# Patient Record
Sex: MALE | Race: WHITE | Employment: OTHER | URBAN - METROPOLITAN AREA
[De-identification: names, ages, dates, MRNs, and addresses within clinical notes are randomized per-mention and may not be internally consistent; named-entity substitution may affect disease eponyms.]

---

## 2019-01-16 ENCOUNTER — HOSPITAL ENCOUNTER (EMERGENCY)
Age: 44
Discharge: HOME | End: 2019-01-16
Attending: EMERGENCY MEDICINE
Payer: OTHER GOVERNMENT

## 2019-01-16 ENCOUNTER — APPOINTMENT (OUTPATIENT)
Dept: CT IMAGING | Age: 44
End: 2019-01-16
Payer: OTHER GOVERNMENT

## 2019-01-16 VITALS
HEART RATE: 80 BPM | BODY MASS INDEX: 25.67 KG/M2 | OXYGEN SATURATION: 94 % | HEIGHT: 74 IN | WEIGHT: 200 LBS | RESPIRATION RATE: 16 BRPM | DIASTOLIC BLOOD PRESSURE: 70 MMHG | SYSTOLIC BLOOD PRESSURE: 102 MMHG | TEMPERATURE: 98 F

## 2019-01-16 DIAGNOSIS — G43.909 MIGRAINE WITHOUT STATUS MIGRAINOSUS, NOT INTRACTABLE, UNSPECIFIED MIGRAINE TYPE: ICD-10-CM

## 2019-01-16 DIAGNOSIS — S09.90XA CLOSED HEAD INJURY, INITIAL ENCOUNTER: Primary | ICD-10-CM

## 2019-01-16 PROCEDURE — 99283 EMERGENCY DEPT VISIT LOW MDM: CPT

## 2019-01-16 PROCEDURE — 70450 CT HEAD/BRAIN W/O DYE: CPT

## 2019-01-16 PROCEDURE — 96374 THER/PROPH/DIAG INJ IV PUSH: CPT

## 2019-01-16 PROCEDURE — 96375 TX/PRO/DX INJ NEW DRUG ADDON: CPT

## 2019-01-16 PROCEDURE — 6360000002 HC RX W HCPCS: Performed by: EMERGENCY MEDICINE

## 2019-01-16 RX ORDER — DIPHENHYDRAMINE HYDROCHLORIDE 50 MG/ML
25 INJECTION INTRAMUSCULAR; INTRAVENOUS ONCE
Status: COMPLETED | OUTPATIENT
Start: 2019-01-16 | End: 2019-01-16

## 2019-01-16 RX ORDER — KETOROLAC TROMETHAMINE 30 MG/ML
30 INJECTION, SOLUTION INTRAMUSCULAR; INTRAVENOUS ONCE
Status: COMPLETED | OUTPATIENT
Start: 2019-01-16 | End: 2019-01-16

## 2019-01-16 RX ORDER — METOCLOPRAMIDE HYDROCHLORIDE 5 MG/ML
10 INJECTION INTRAMUSCULAR; INTRAVENOUS ONCE
Status: COMPLETED | OUTPATIENT
Start: 2019-01-16 | End: 2019-01-16

## 2019-01-16 RX ADMIN — DIPHENHYDRAMINE HYDROCHLORIDE 25 MG: 50 INJECTION, SOLUTION INTRAMUSCULAR; INTRAVENOUS at 12:47

## 2019-01-16 RX ADMIN — KETOROLAC TROMETHAMINE 30 MG: 30 INJECTION, SOLUTION INTRAMUSCULAR; INTRAVENOUS at 12:48

## 2019-01-16 RX ADMIN — METOCLOPRAMIDE 10 MG: 5 INJECTION, SOLUTION INTRAMUSCULAR; INTRAVENOUS at 12:48

## 2019-01-16 ASSESSMENT — ENCOUNTER SYMPTOMS
VOMITING: 0
NAUSEA: 1
BACK PAIN: 0

## 2019-01-16 ASSESSMENT — PAIN - FUNCTIONAL ASSESSMENT: PAIN_FUNCTIONAL_ASSESSMENT: PREVENTS OR INTERFERES SOME ACTIVE ACTIVITIES AND ADLS

## 2019-01-16 ASSESSMENT — PAIN DESCRIPTION - FREQUENCY: FREQUENCY: CONTINUOUS

## 2019-01-16 ASSESSMENT — PAIN SCALES - GENERAL
PAINLEVEL_OUTOF10: 6
PAINLEVEL_OUTOF10: 6

## 2019-01-16 ASSESSMENT — PAIN DESCRIPTION - DESCRIPTORS: DESCRIPTORS: HEADACHE;ACHING;CONSTANT

## 2019-03-29 ENCOUNTER — APPOINTMENT (OUTPATIENT)
Dept: CT IMAGING | Age: 44
End: 2019-03-29
Payer: OTHER GOVERNMENT

## 2019-03-29 ENCOUNTER — APPOINTMENT (OUTPATIENT)
Dept: MRI IMAGING | Age: 44
End: 2019-03-29
Payer: OTHER GOVERNMENT

## 2019-03-29 ENCOUNTER — HOSPITAL ENCOUNTER (OUTPATIENT)
Age: 44
Setting detail: OBSERVATION
Discharge: HOME | End: 2019-03-30
Attending: EMERGENCY MEDICINE | Admitting: INTERNAL MEDICINE
Payer: OTHER GOVERNMENT

## 2019-03-29 ENCOUNTER — APPOINTMENT (OUTPATIENT)
Dept: ULTRASOUND IMAGING | Age: 44
End: 2019-03-29
Payer: OTHER GOVERNMENT

## 2019-03-29 ENCOUNTER — APPOINTMENT (OUTPATIENT)
Dept: GENERAL RADIOLOGY | Age: 44
End: 2019-03-29
Payer: OTHER GOVERNMENT

## 2019-03-29 DIAGNOSIS — R27.0 ATAXIA: Primary | ICD-10-CM

## 2019-03-29 PROBLEM — E03.9 HYPOTHYROID: Status: ACTIVE | Noted: 2019-03-29

## 2019-03-29 PROBLEM — F32.A DEPRESSION: Status: ACTIVE | Noted: 2019-03-29

## 2019-03-29 PROBLEM — G43.909 MIGRAINE: Status: ACTIVE | Noted: 2019-03-29

## 2019-03-29 LAB
ANION GAP SERPL CALCULATED.3IONS-SCNC: 9 MMOL/L (ref 7–16)
BASOPHILS ABSOLUTE: 0.01 E9/L (ref 0–0.2)
BASOPHILS RELATIVE PERCENT: 0.2 % (ref 0–2)
BUN BLDV-MCNC: 9 MG/DL (ref 6–20)
CALCIUM SERPL-MCNC: 9 MG/DL (ref 8.6–10.2)
CHLORIDE BLD-SCNC: 106 MMOL/L (ref 98–107)
CO2: 28 MMOL/L (ref 22–29)
CREAT SERPL-MCNC: 1.3 MG/DL (ref 0.7–1.2)
EOSINOPHILS ABSOLUTE: 0.07 E9/L (ref 0.05–0.5)
EOSINOPHILS RELATIVE PERCENT: 1.5 % (ref 0–6)
GFR AFRICAN AMERICAN: >60
GFR NON-AFRICAN AMERICAN: 60 ML/MIN/1.73
GLUCOSE BLD-MCNC: 105 MG/DL (ref 74–99)
HCT VFR BLD CALC: 44.8 % (ref 37–54)
HEMOGLOBIN: 15.3 G/DL (ref 12.5–16.5)
IMMATURE GRANULOCYTES #: 0.01 E9/L
IMMATURE GRANULOCYTES %: 0.2 % (ref 0–5)
LYMPHOCYTES ABSOLUTE: 1.64 E9/L (ref 1.5–4)
LYMPHOCYTES RELATIVE PERCENT: 35.8 % (ref 20–42)
MCH RBC QN AUTO: 31.4 PG (ref 26–35)
MCHC RBC AUTO-ENTMCNC: 34.2 % (ref 32–34.5)
MCV RBC AUTO: 92 FL (ref 80–99.9)
MONOCYTES ABSOLUTE: 0.35 E9/L (ref 0.1–0.95)
MONOCYTES RELATIVE PERCENT: 7.6 % (ref 2–12)
NEUTROPHILS ABSOLUTE: 2.5 E9/L (ref 1.8–7.3)
NEUTROPHILS RELATIVE PERCENT: 54.7 % (ref 43–80)
PDW BLD-RTO: 12.6 FL (ref 11.5–15)
PLATELET # BLD: 196 E9/L (ref 130–450)
PMV BLD AUTO: 11.5 FL (ref 7–12)
POTASSIUM REFLEX MAGNESIUM: 3.9 MMOL/L (ref 3.5–5)
RBC # BLD: 4.87 E12/L (ref 3.8–5.8)
SODIUM BLD-SCNC: 143 MMOL/L (ref 132–146)
TROPONIN: <0.01 NG/ML (ref 0–0.03)
WBC # BLD: 4.6 E9/L (ref 4.5–11.5)

## 2019-03-29 PROCEDURE — 99285 EMERGENCY DEPT VISIT HI MDM: CPT

## 2019-03-29 PROCEDURE — G0378 HOSPITAL OBSERVATION PER HR: HCPCS

## 2019-03-29 PROCEDURE — 70551 MRI BRAIN STEM W/O DYE: CPT

## 2019-03-29 PROCEDURE — 70450 CT HEAD/BRAIN W/O DYE: CPT

## 2019-03-29 PROCEDURE — 85025 COMPLETE CBC W/AUTO DIFF WBC: CPT

## 2019-03-29 PROCEDURE — 93880 EXTRACRANIAL BILAT STUDY: CPT

## 2019-03-29 PROCEDURE — 94761 N-INVAS EAR/PLS OXIMETRY MLT: CPT

## 2019-03-29 PROCEDURE — 71046 X-RAY EXAM CHEST 2 VIEWS: CPT

## 2019-03-29 PROCEDURE — 93005 ELECTROCARDIOGRAM TRACING: CPT | Performed by: STUDENT IN AN ORGANIZED HEALTH CARE EDUCATION/TRAINING PROGRAM

## 2019-03-29 PROCEDURE — 70544 MR ANGIOGRAPHY HEAD W/O DYE: CPT

## 2019-03-29 PROCEDURE — 80048 BASIC METABOLIC PNL TOTAL CA: CPT

## 2019-03-29 PROCEDURE — 36592 COLLECT BLOOD FROM PICC: CPT

## 2019-03-29 PROCEDURE — 37799 UNLISTED PX VASCULAR SURGERY: CPT

## 2019-03-29 PROCEDURE — 84484 ASSAY OF TROPONIN QUANT: CPT

## 2019-03-29 PROCEDURE — 36591 DRAW BLOOD OFF VENOUS DEVICE: CPT

## 2019-03-29 PROCEDURE — 36415 COLL VENOUS BLD VENIPUNCTURE: CPT

## 2019-03-29 RX ORDER — SODIUM CHLORIDE 0.9 % (FLUSH) 0.9 %
10 SYRINGE (ML) INJECTION EVERY 12 HOURS SCHEDULED
Status: DISCONTINUED | OUTPATIENT
Start: 2019-03-29 | End: 2019-03-30 | Stop reason: HOSPADM

## 2019-03-29 RX ORDER — ACETAMINOPHEN 325 MG/1
650 TABLET ORAL ONCE
Status: COMPLETED | OUTPATIENT
Start: 2019-03-29 | End: 2019-03-30

## 2019-03-29 RX ORDER — ACETAMINOPHEN 325 MG/1
650 TABLET ORAL EVERY 4 HOURS PRN
Status: DISCONTINUED | OUTPATIENT
Start: 2019-03-29 | End: 2019-03-30 | Stop reason: HOSPADM

## 2019-03-29 RX ORDER — SODIUM CHLORIDE 0.9 % (FLUSH) 0.9 %
10 SYRINGE (ML) INJECTION PRN
Status: DISCONTINUED | OUTPATIENT
Start: 2019-03-29 | End: 2019-03-30 | Stop reason: HOSPADM

## 2019-03-29 RX ORDER — SODIUM CHLORIDE 9 MG/ML
INJECTION, SOLUTION INTRAVENOUS CONTINUOUS
Status: DISCONTINUED | OUTPATIENT
Start: 2019-03-29 | End: 2019-03-30 | Stop reason: HOSPADM

## 2019-03-29 RX ORDER — ONDANSETRON 2 MG/ML
4 INJECTION INTRAMUSCULAR; INTRAVENOUS EVERY 6 HOURS PRN
Status: DISCONTINUED | OUTPATIENT
Start: 2019-03-29 | End: 2019-03-30 | Stop reason: HOSPADM

## 2019-03-29 RX ORDER — ASPIRIN 81 MG/1
81 TABLET ORAL DAILY
Status: DISCONTINUED | OUTPATIENT
Start: 2019-03-29 | End: 2019-03-30 | Stop reason: HOSPADM

## 2019-03-29 RX ORDER — ATORVASTATIN CALCIUM 40 MG/1
40 TABLET, FILM COATED ORAL NIGHTLY
Status: DISCONTINUED | OUTPATIENT
Start: 2019-03-29 | End: 2019-03-30 | Stop reason: HOSPADM

## 2019-03-29 ASSESSMENT — ENCOUNTER SYMPTOMS
DIARRHEA: 0
ABDOMINAL DISTENTION: 0
BLOOD IN STOOL: 0
VISUAL CHANGE: 0
APNEA: 0
EYE PAIN: 0
CHOKING: 0
VOMITING: 0
COLOR CHANGE: 0
NAUSEA: 0
SHORTNESS OF BREATH: 0
ABDOMINAL PAIN: 0
CONSTIPATION: 0

## 2019-03-29 NOTE — ED PROVIDER NOTES
Patient is a 20-year-old gentleman who presents today from [place] to the emergency room chief complaint of dizziness. Patient states that in January he had an injury to assess for which she was seen at [address] [place] [address]. He states that at that time he was discharged home. Patient does state that he has a history of vertigo prior to any of this happening. He states that for the last 2 weeks he has had increasing dizziness which she describes as everything spinning around him. He also has had episodes that he describes as things on the floor moving. He states that furthermore he will have involuntary twitches of various extremities from time to time again lasting for the last 2 weeks. Patient states that these episodes occur both at rest and with movement. He states that they typically only last a few seconds at a time. Patient states that this morning he went to the [place] to be evaluated. He states that at that point, the [place] doctor told him to hold his arms and legs the chair with his eyes closed. Patient states that he attempted this and fell out of the chair and was caught by the doctor. Furthermore, patient states that the doctor felt the top of his head where he had his previous head injury and states that he did feel a bump. He was sent to the ED to be evaluated for potential subdural hematoma. Patient denies any other symptoms including nausea, vomiting, diarrhea, chest pain, shortness of breath, abdominal pain, or constipation or urinary symptoms. The history is provided by the patient.    Dizziness   Quality:  Room spinning  Severity:  Severe  Onset quality:  Gradual  Duration:  2 weeks  Timing:  Constant  Progression:  Waxing and waning  Chronicity:  New  Relieved by:  None tried  Worsened by:  Eye movement  Ineffective treatments:  None tried  Associated symptoms: no blood in stool, no chest pain, no diarrhea, no headaches, no hearing loss, no nausea, no palpitations, no shortness of breath, no syncope, specific details for the plan of care and counseling regarding the diagnosis and prognosis. Their questions are answered at this time and they are agreeable with the plan of admission.    --------------------------------- ADDITIONAL PROVIDER NOTES ---------------------------------  Consultations:  Time: 1830. Spoke with Dr. Roman Aldridge. Discussed case. They will admit the patient. This patient's ED course included: a personal history and physicial examination    This patient has remained hemodynamically stable during their ED course. Diagnosis:  1. Ataxia        Disposition:  Patient's disposition: Admit to med/surg floor  Patient's condition is stable.            Chula Rome DO  Resident  03/29/19 4167

## 2019-03-29 NOTE — CARE COORDINATION
CM met with patient while he was in the ER. Patient was sent to [facility] ER per ambulance from the [place] on [address]. Patient states his PCP is Dr. Gem Vargas and he does obtain his meds from the [place]. Patient states he does not follow with any other community PCP. CM notified insurance verification to verify patient's VA benefits.   Randi Naik RN

## 2019-03-29 NOTE — ED NOTES
ATTENDING PROVIDER ATTESTATION:     Gino Chen presented to the emergency department for evaluation of Dizziness (hit head in Jan, had ct scan at [facility],[address], dizziness worsening over last 2wks, sent from [place] for r/o SDH)   and was initially evaluated by the Medical Resident. See Original ED Note for H&P and ED course above. I have reviewed and discussed the case, including pertinent history (medical, surgical, family and social) and exam findings with the Medical Resident assigned to Gino April. I have personally performed and/or participated in the history, exam, medical decision making, and procedures and agree with all pertinent clinical information and any additional changes or corrections are noted below in my assessment and plan. I have discussed this patient in detail with the resident, and provided the instruction and education,       I have reviewed my findings and recommendations with the assigned Medical Resident, iGno Chen and members of family present at the time of disposition. Review of Systems:   Pertinent positives and negatives are stated within HPI, all other systems reviewed and are negative.    --------------------------------------------- PAST HISTORY ---------------------------------------------  Past Medical History:  has a past medical history of Alpha-1-antitrypsin deficiency ([facility] [address] Anxiety, Depression, Fatty liver, Intermittent explosive disorder, Peripheral neuropathic pain, and PTSD (post-traumatic stress disorder). Past Surgical History:  has no past surgical history on file. Social History:  reports that he has never smoked. He has never used smokeless tobacco. He reports that he does not drink alcohol or use drugs. Family History: family history is not on file. The patients home medications have been reviewed.     Allergies: Klonopin [clonazepam] and Lorazepam            Dizziness for 2 weeks  Reports ataxic and offbalance gait  Hx of positional vertigo but this is different  No other neuro sx or unilateral weakness or speech changes  Exam has ataxia  When he closes his eyes he lists to the side  No motor weakness  No nystagmus  HINTS exam no clear cerebellar signs  CT neg for ICH  Needs admission for MRI/ataxia work up    1.  Augustina Burton MD  03/29/19 1826

## 2019-03-30 VITALS
TEMPERATURE: 97 F | RESPIRATION RATE: 18 BRPM | BODY MASS INDEX: 25.67 KG/M2 | OXYGEN SATURATION: 97 % | WEIGHT: 200 LBS | HEART RATE: 77 BPM | SYSTOLIC BLOOD PRESSURE: 129 MMHG | DIASTOLIC BLOOD PRESSURE: 97 MMHG | HEIGHT: 74 IN

## 2019-03-30 LAB
EKG ATRIAL RATE: 71 BPM
EKG P AXIS: 41 DEGREES
EKG P-R INTERVAL: 194 MS
EKG Q-T INTERVAL: 388 MS
EKG QRS DURATION: 94 MS
EKG QTC CALCULATION (BAZETT): 421 MS
EKG R AXIS: 42 DEGREES
EKG T AXIS: 37 DEGREES
EKG VENTRICULAR RATE: 71 BPM

## 2019-03-30 PROCEDURE — 96360 HYDRATION IV INFUSION INIT: CPT

## 2019-03-30 PROCEDURE — 6370000000 HC RX 637 (ALT 250 FOR IP): Performed by: INTERNAL MEDICINE

## 2019-03-30 PROCEDURE — G0378 HOSPITAL OBSERVATION PER HR: HCPCS

## 2019-03-30 PROCEDURE — 97530 THERAPEUTIC ACTIVITIES: CPT

## 2019-03-30 PROCEDURE — 97162 PT EVAL MOD COMPLEX 30 MIN: CPT

## 2019-03-30 PROCEDURE — 6370000000 HC RX 637 (ALT 250 FOR IP): Performed by: STUDENT IN AN ORGANIZED HEALTH CARE EDUCATION/TRAINING PROGRAM

## 2019-03-30 PROCEDURE — 2580000003 HC RX 258: Performed by: INTERNAL MEDICINE

## 2019-03-30 PROCEDURE — 96361 HYDRATE IV INFUSION ADD-ON: CPT

## 2019-03-30 RX ORDER — TRAZODONE HYDROCHLORIDE 50 MG/1
50 TABLET ORAL NIGHTLY
Status: DISCONTINUED | OUTPATIENT
Start: 2019-03-30 | End: 2019-03-30 | Stop reason: HOSPADM

## 2019-03-30 RX ORDER — RISPERIDONE 2 MG/1
2 TABLET, FILM COATED ORAL DAILY
Status: DISCONTINUED | OUTPATIENT
Start: 2019-03-30 | End: 2019-03-30 | Stop reason: HOSPADM

## 2019-03-30 RX ORDER — GABAPENTIN 400 MG/1
400 CAPSULE ORAL 2 TIMES DAILY
Status: DISCONTINUED | OUTPATIENT
Start: 2019-03-30 | End: 2019-03-30 | Stop reason: HOSPADM

## 2019-03-30 RX ORDER — BENZTROPINE MESYLATE 1 MG/1
1 TABLET ORAL NIGHTLY
Status: DISCONTINUED | OUTPATIENT
Start: 2019-03-30 | End: 2019-03-30 | Stop reason: HOSPADM

## 2019-03-30 RX ORDER — NAPROXEN 500 MG/1
500 TABLET ORAL 2 TIMES DAILY
Status: DISCONTINUED | OUTPATIENT
Start: 2019-03-30 | End: 2019-03-30 | Stop reason: HOSPADM

## 2019-03-30 RX ORDER — SUMATRIPTAN 50 MG/1
50 TABLET, FILM COATED ORAL 4 TIMES DAILY PRN
Status: DISCONTINUED | OUTPATIENT
Start: 2019-03-30 | End: 2019-03-30 | Stop reason: HOSPADM

## 2019-03-30 RX ORDER — PANTOPRAZOLE SODIUM 40 MG/1
40 TABLET, DELAYED RELEASE ORAL DAILY
Status: DISCONTINUED | OUTPATIENT
Start: 2019-03-30 | End: 2019-03-30 | Stop reason: HOSPADM

## 2019-03-30 RX ORDER — BUPROPION HYDROCHLORIDE 300 MG/1
300 TABLET ORAL EVERY MORNING
Status: DISCONTINUED | OUTPATIENT
Start: 2019-03-30 | End: 2019-03-30 | Stop reason: HOSPADM

## 2019-03-30 RX ORDER — ROPINIROLE 0.25 MG/1
0.25 TABLET, FILM COATED ORAL NIGHTLY
Status: DISCONTINUED | OUTPATIENT
Start: 2019-03-30 | End: 2019-03-30 | Stop reason: HOSPADM

## 2019-03-30 RX ADMIN — ACETAMINOPHEN 650 MG: 325 TABLET, FILM COATED ORAL at 00:54

## 2019-03-30 RX ADMIN — ACETAMINOPHEN 650 MG: 325 TABLET, FILM COATED ORAL at 10:55

## 2019-03-30 RX ADMIN — RISPERIDONE 2 MG: 2 TABLET, FILM COATED ORAL at 12:12

## 2019-03-30 RX ADMIN — BUPROPION HYDROCHLORIDE 300 MG: 300 TABLET, FILM COATED, EXTENDED RELEASE ORAL at 12:12

## 2019-03-30 RX ADMIN — GABAPENTIN 400 MG: 400 CAPSULE ORAL at 12:12

## 2019-03-30 RX ADMIN — ASPIRIN 81 MG: 81 TABLET ORAL at 10:55

## 2019-03-30 RX ADMIN — SODIUM CHLORIDE: 9 INJECTION, SOLUTION INTRAVENOUS at 00:12

## 2019-03-30 RX ADMIN — SODIUM CHLORIDE: 9 INJECTION, SOLUTION INTRAVENOUS at 12:48

## 2019-03-30 RX ADMIN — PANTOPRAZOLE SODIUM 40 MG: 40 TABLET, DELAYED RELEASE ORAL at 12:12

## 2019-03-30 ASSESSMENT — PAIN SCALES - GENERAL
PAINLEVEL_OUTOF10: 6
PAINLEVEL_OUTOF10: 8
PAINLEVEL_OUTOF10: 6

## 2019-03-30 ASSESSMENT — PAIN DESCRIPTION - ONSET: ONSET: ON-GOING

## 2019-03-30 ASSESSMENT — PAIN DESCRIPTION - PAIN TYPE: TYPE: ACUTE PAIN

## 2019-03-30 ASSESSMENT — PAIN DESCRIPTION - PROGRESSION: CLINICAL_PROGRESSION: NOT CHANGED

## 2019-03-30 ASSESSMENT — PAIN DESCRIPTION - ORIENTATION: ORIENTATION: ANTERIOR

## 2019-03-30 ASSESSMENT — PAIN DESCRIPTION - LOCATION: LOCATION: HEAD

## 2019-03-30 ASSESSMENT — PAIN DESCRIPTION - FREQUENCY: FREQUENCY: INTERMITTENT

## 2019-03-30 ASSESSMENT — PAIN DESCRIPTION - DESCRIPTORS: DESCRIPTORS: ACHING;DISCOMFORT

## 2019-03-30 NOTE — H&P
OF SYSTEMS:  As above in the HPI, otherwise negative    PHYSICAL EXAM:    Vitals:  /62   Pulse 73   Temp 97.1 °F (36.2 °C) (Temporal)   Resp 16   Ht 6' 2\" (1.88 m)   Wt 200 lb (90.7 kg)   SpO2 94%   BMI 25.68 kg/m²     General:  Awake, alert, oriented X 3. Well developed, well nourished, well groomed. No apparent distress. HEENT:  Normocephalic, atraumatic. Pupils equal, round, reactive to light. No scleral icterus. No conjunctival injection. Normal lips, teeth, and gums. No nasal discharge. Neck:  Supple  Heart:  RRR, no murmurs, gallops, rubs  Lungs:  CTA bilaterally, bilat symmetrical expansion, no wheeze, rales, or rhonchi  Abdomen:   Bowel sounds present, soft, nontender, no masses, no organomegaly, no peritoneal signs  Extremities:  No clubbing, cyanosis, or edema  Skin:  Warm and dry, no open lesions or rash  Neuro:  Cranial nerves 2-12 intact, no focal deficits  Breast: deferred  Rectal: deferred  Genitalia:  deferred    LABS:    CBC with Differential:    Lab Results   Component Value Date    WBC 4.6 03/29/2019    RBC 4.87 03/29/2019    HGB 15.3 03/29/2019    HCT 44.8 03/29/2019     03/29/2019    MCV 92.0 03/29/2019    MCH 31.4 03/29/2019    MCHC 34.2 03/29/2019    RDW 12.6 03/29/2019    SEGSPCT 72 05/09/2013    LYMPHOPCT 35.8 03/29/2019    MONOPCT 7.6 03/29/2019    BASOPCT 0.2 03/29/2019    MONOSABS 0.35 03/29/2019    LYMPHSABS 1.64 03/29/2019    EOSABS 0.07 03/29/2019    BASOSABS 0.01 03/29/2019     CMP:    Lab Results   Component Value Date     03/29/2019    K 3.9 03/29/2019     03/29/2019    CO2 28 03/29/2019    BUN 9 03/29/2019    CREATININE 1.3 03/29/2019    GFRAA >60 03/29/2019    LABGLOM 60 03/29/2019    GLUCOSE 105 03/29/2019    GLUCOSE 105 05/14/2011    PROT 7.3 05/09/2013    LABALBU 4.4 05/09/2013    LABALBU 4.2 05/14/2011    CALCIUM 9.0 03/29/2019    BILITOT 0.4 05/09/2013    ALKPHOS 74 05/09/2013    AST 31 05/09/2013    ALT 51 05/09/2013       US CAROTID ARTERY BILATERAL   Final Result      No evidence for hemodynamically significant stenosis of the carotid   arteries based on NASCET criteria (0-49%)      No evidence for subclavian steal.      MRA head w/o contrast   Final Result      1. No evidence of acute intracranial ischemia, edema, or hemorrhage. 2. No evidence of intracranial arterial stenosis. 3. Focal outpocketing at level of proximal left posterior cerebral   artery could suggest focal ectasia or aneurysm measuring 1.5 mm wide   and 1 mm tall. Continued follow-up could be helpful for further   evaluation. 4. Fluid signal seen at level of right mastoid air cells could suggest   mastoiditis with appropriate clinical history. ALERT:  THIS IS AN ABNORMAL REPORT. MRI brain without contrast   Final Result      1. No evidence of acute intracranial ischemia, edema, or hemorrhage. 2. No evidence of intracranial arterial stenosis. 3. Focal outpocketing at level of proximal left posterior cerebral   artery could suggest focal ectasia or aneurysm measuring 1.5 mm wide   and 1 mm tall. Continued follow-up could be helpful for further   evaluation. 4. Fluid signal seen at level of right mastoid air cells could suggest   mastoiditis with appropriate clinical history. ALERT:  THIS IS AN ABNORMAL REPORT. XR CHEST STANDARD (2 VW)   Final Result      Negative two-view chest.      CT HEAD WO CONTRAST   Final Result      No evidence of acute intracranial hemorrhage or edema. ASSESSMENT:      Principal Problem:    Vertigo  Active Problems:    Hypothyroid    Migraine    Depression    Ataxia  Resolved Problems:    * No resolved hospital problems.  *      PLAN:    Admit  asa  Statin  MRI negative  MRA- small aneurysm L PCA  carotid US- negative  Neuro cs  echo  PT/OT  D/C planning         Electronically signed by Tod Combs MD on 3/30/2019 at 8:41 AM

## 2019-03-30 NOTE — PROGRESS NOTES
Pt was asking questions about further plan of care and discharge. Dr. Bruce Andrade was called and left with a voice message stating patient had questions about plan of care. Awaiting call back or further orders.

## 2019-04-05 ENCOUNTER — INITIAL CONSULT (OUTPATIENT)
Dept: NEUROSURGERY | Age: 44
End: 2019-04-05
Payer: MEDICARE

## 2019-04-05 VITALS
WEIGHT: 202 LBS | DIASTOLIC BLOOD PRESSURE: 80 MMHG | SYSTOLIC BLOOD PRESSURE: 117 MMHG | HEART RATE: 93 BPM | BODY MASS INDEX: 25.93 KG/M2 | HEIGHT: 74 IN

## 2019-04-05 DIAGNOSIS — I72.9 ANEURYSM (HCC): Primary | ICD-10-CM

## 2019-04-05 PROCEDURE — G8419 CALC BMI OUT NRM PARAM NOF/U: HCPCS | Performed by: NEUROLOGICAL SURGERY

## 2019-04-05 PROCEDURE — 99203 OFFICE O/P NEW LOW 30 MIN: CPT | Performed by: NEUROLOGICAL SURGERY

## 2019-04-05 PROCEDURE — G8427 DOCREV CUR MEDS BY ELIG CLIN: HCPCS | Performed by: NEUROLOGICAL SURGERY

## 2019-04-05 ASSESSMENT — ENCOUNTER SYMPTOMS
SHORTNESS OF BREATH: 0
TROUBLE SWALLOWING: 0
ABDOMINAL PAIN: 0
PHOTOPHOBIA: 0

## 2019-04-05 NOTE — PROGRESS NOTES
[address] [address] [address]  Dept: [phone]    Chief Complaint:   Chief Complaint   Patient presents with    Consultation     has a brain aneurysm    Headache    Memory Loss        Sayra Lantigua is being referred by Dr. Kelechi Kirby as a consultation for evaluation of    Chief Complaint   Patient presents with    Consultation     has a brain aneurysm    Headache    Memory Loss      Dear Dr. Nerissa Cruz,    Thank you for referring your patient to be seen. As you know, Sayra Lantigua is 40years old. He has history of migraines, depression, ataxia, and vertigo. He was recently admitted to the hospital on March 29, 2019 for worsening dizziness over the past 2 weeks. He had workup performed at [place] [address].  He had MRI and MRA of the brain performed. MRI of the brain did not demonstrate any stroke or lesion. MRA of the head demonstrated an incidental outpouching of the left P1 segment measuring 1 mm x 1.5 mm. I reviewed the imaging. There is a vessel that is coming off of the outpouching and this is consistent with an infundibulum. At present he still has occasional dizziness. He gets his care at the [place]. He sometimes feels that the room is spinning. The spells come and go. She states that he had been evaluated by ENT in the past but he has not had any recent evaluation. He has occasional headache. He denies any associated nausea or vomiting. He does have occasional headaches which are located in the bitemporal region. He denies any focal weakness or numbness. The symptoms are of moderate severity. He does not smoke. He states that his grandmother had an aneurysm. I independently reviewed his imaging as described above. I independently reviewed his laboratory results. Sodium 143. Hemoglobin 15.3. Platelets 459.        Past Medical History:   Diagnosis Date    Alpha-[facility]-antitrypsin deficiency ([facility]     Alpha-1-antitrypsin deficiency carrier ([facility]     Anxiety     Depression     Fatty liver     Intermittent explosive disorder     Peripheral neuropathic pain     PTSD (post-traumatic stress disorder)      Past Surgical History:   Procedure Laterality Date    COSMETIC SURGERY      LIVER BIOPSY      SKIN BIOPSY        No family history on file.    Social History     Socioeconomic History    Marital status:      Spouse name: Not on file    Number of children: Not on file    Years of education: Not on file    Highest education level: Not on file   Occupational History    Not on file   Social Needs    Financial resource strain: Not on file    Food insecurity:     Worry: Not on file     Inability: Not on file    Transportation needs:     Medical: Not on file     Non-medical: Not on file   Tobacco Use    Smoking status: Never Smoker    Smokeless tobacco: Never Used   Substance and Sexual Activity    Alcohol use: No    Drug use: No    Sexual activity: Not on file   Lifestyle    Physical activity:     Days per week: Not on file     Minutes per session: Not on file    Stress: Not on file   Relationships    Social connections:     Talks on phone: Not on file     Gets together: Not on file     Attends Zoroastrianism service: Not on file     Active member of club or organization: Not on file     Attends meetings of clubs or organizations: Not on file     Relationship status: Not on file    Intimate partner violence:     Fear of current or ex partner: Not on file     Emotionally abused: Not on file     Physically abused: Not on file     Forced sexual activity: Not on file   Other Topics Concern    Not on file   Social History Narrative    Not on file       Medications:   Current Outpatient Medications   Medication Sig Dispense Refill    buPROPion (WELLBUTRIN XL) 300 MG extended release tablet Take 300 mg by mouth every morning      benztropine (COGENTIN) 1 MG tablet Take 1 mg by mouth nightly       rOPINIRole (REQUIP) 0.25 MG tablet Take 0.25 mg by mouth nightly       sildenafil (VIAGRA) 100 MG tablet Take 100 mg by mouth as needed for Erectile Dysfunction      SUMAtriptan (IMITREX) 50 MG tablet Take 50 mg by mouth 4 times daily as needed for Migraine       gabapentin (NEURONTIN) 400 MG capsule Take 400 mg by mouth 2 times daily.  omeprazole (PRILOSEC) 20 MG capsule Take 40 mg by mouth daily.  RisperiDONE (RISPERDAL PO) Take 2 mg by mouth daily       traZODone (DESYREL) 50 MG tablet Take 50 mg by mouth nightly       Levothyroxine Sodium (LEVOTHROID PO) Take 0.137 mcg by mouth daily        No current facility-administered medications for this visit. Allergies: Allergies as of 04/05/2019 - Review Complete 04/05/2019   Allergen Reaction Noted    Klonopin [clonazepam]  06/17/2012    Lorazepam  06/17/2012          Review of Systems   Constitutional: Negative for fever. HENT: Negative for trouble swallowing. Eyes: Negative for photophobia. Respiratory: Negative for shortness of breath. Cardiovascular: Negative for chest pain. Gastrointestinal: Negative for abdominal pain. Endocrine: Negative for heat intolerance. Genitourinary: Negative for flank pain. Musculoskeletal: Negative for myalgias. Skin: Negative for wound. Neurological: Positive for dizziness and light-headedness. Negative for headaches. Psychiatric/Behavioral: Negative for confusion. Physical Exam   Constitutional: He appears well-nourished. HENT:   Head: Normocephalic. Eyes: Pupils are equal, round, and reactive to light. EOM are normal.   Neck: Normal range of motion. Cardiovascular: Normal rate. Pulmonary/Chest: Effort normal. No stridor. Abdominal: He exhibits no distension. Neurological: He is alert. He has normal reflexes.    CN 3-12 intact  Motor strength full  Sensation intact to light touch    No nystagmus  Finger to nose testing intact  Tandem gait intact

## 2020-04-24 ENCOUNTER — HOSPITAL ENCOUNTER (OUTPATIENT)
Dept: CT IMAGING | Age: 45
Discharge: HOME | End: 2020-04-26
Payer: MEDICARE

## 2020-04-24 ENCOUNTER — OFFICE VISIT (OUTPATIENT)
Dept: NEUROSURGERY | Age: 45
End: 2020-04-24
Payer: MEDICARE

## 2020-04-24 VITALS — DIASTOLIC BLOOD PRESSURE: 70 MMHG | SYSTOLIC BLOOD PRESSURE: 126 MMHG | HEART RATE: 83 BPM | TEMPERATURE: 97.7 F

## 2020-04-24 PROCEDURE — 1036F TOBACCO NON-USER: CPT | Performed by: PHYSICIAN ASSISTANT

## 2020-04-24 PROCEDURE — G8421 BMI NOT CALCULATED: HCPCS | Performed by: PHYSICIAN ASSISTANT

## 2020-04-24 PROCEDURE — G8427 DOCREV CUR MEDS BY ELIG CLIN: HCPCS | Performed by: PHYSICIAN ASSISTANT

## 2020-04-24 PROCEDURE — 2580000003 HC RX 258: Performed by: RADIOLOGY

## 2020-04-24 PROCEDURE — 99213 OFFICE O/P EST LOW 20 MIN: CPT | Performed by: PHYSICIAN ASSISTANT

## 2020-04-24 PROCEDURE — 70496 CT ANGIOGRAPHY HEAD: CPT

## 2020-04-24 PROCEDURE — 6360000004 HC RX CONTRAST MEDICATION: Performed by: RADIOLOGY

## 2020-04-24 RX ORDER — SODIUM CHLORIDE 0.9 % (FLUSH) 0.9 %
10 SYRINGE (ML) INJECTION ONCE
Status: COMPLETED | OUTPATIENT
Start: 2020-04-24 | End: 2020-04-24

## 2020-04-24 RX ADMIN — IOPAMIDOL 60 ML: 755 INJECTION, SOLUTION INTRAVENOUS at 10:17

## 2020-04-24 RX ADMIN — Medication 10 ML: at 10:17

## 2020-04-24 NOTE — PROGRESS NOTES
Office Follow-up     Subjective: Shanita Ye is a 39 y.o.  male who has a past medical history of migraines, depression, and vertigo. He was previously seen in our Neurosurgery office by Dr. Zita Hanley on 19 for left P1 segment infundibulum. He was told to follow up in 1 year with a repeat CTA to evaluate for interval change. Pt presents to the office today for a one year follow up. Pt states he continues to have headaches almost every day. Dizziness has improved. Denies new symptoms. CTA reviewed.      Physical Exam:              WDWN, no apparent distress              Non-labored breathing               Vitals Stable              Alert and oriented x3              CN 3-12 intact              PERRL              EOMI              LEACH well              Motor strength symmetric              Sensation to LT intact bilaterally   No nystagmus    Finger to nose intact                Imagin20 CTA head: Incidental left P1 segment infundibulum again noted. No interval change since previous imaging 3/29/19.      Assessment: This is a 39 y.o.  male presenting for a one year follow-up     Plan:  -No interval change of infundibulum. No further follow-up needed. -Referral made to neurology for headache management  -Follow-up in neurosurgery clinic PRN  -Call or return to neurosurgery office sooner if symptoms worsen or if new issues arise in the interim.     Electronically signed by Lolly Marcano PA-C on 2020 at 11:23 AM

## 2020-06-18 ENCOUNTER — TELEPHONE (OUTPATIENT)
Dept: NEUROLOGY | Age: 45
End: 2020-06-18

## 2020-06-18 ENCOUNTER — OFFICE VISIT (OUTPATIENT)
Dept: NEUROLOGY | Age: 45
End: 2020-06-18
Payer: MEDICARE

## 2020-06-18 VITALS
WEIGHT: 200 LBS | BODY MASS INDEX: 25.67 KG/M2 | DIASTOLIC BLOOD PRESSURE: 78 MMHG | OXYGEN SATURATION: 96 % | SYSTOLIC BLOOD PRESSURE: 123 MMHG | HEART RATE: 74 BPM | TEMPERATURE: 97 F | HEIGHT: 74 IN | RESPIRATION RATE: 12 BRPM

## 2020-06-18 PROCEDURE — 99215 OFFICE O/P EST HI 40 MIN: CPT | Performed by: PHYSICIAN ASSISTANT

## 2020-06-18 PROCEDURE — G8427 DOCREV CUR MEDS BY ELIG CLIN: HCPCS | Performed by: PHYSICIAN ASSISTANT

## 2020-06-18 PROCEDURE — G8419 CALC BMI OUT NRM PARAM NOF/U: HCPCS | Performed by: PHYSICIAN ASSISTANT

## 2020-06-18 PROCEDURE — 1036F TOBACCO NON-USER: CPT | Performed by: PHYSICIAN ASSISTANT

## 2020-06-18 RX ORDER — SUMATRIPTAN 100 MG/1
100 TABLET, FILM COATED ORAL PRN
Qty: 9 TABLET | Refills: 3 | Status: SHIPPED | OUTPATIENT
Start: 2020-06-18

## 2020-06-18 RX ORDER — GALCANEZUMAB 120 MG/ML
120 INJECTION, SOLUTION SUBCUTANEOUS
Qty: 1 PEN | Refills: 11 | Status: SHIPPED | OUTPATIENT
Start: 2020-06-18 | End: 2020-10-01 | Stop reason: SDUPTHER

## 2020-06-18 RX ORDER — PANTOPRAZOLE SODIUM 40 MG/1
40 TABLET, DELAYED RELEASE ORAL DAILY
COMMUNITY

## 2020-06-18 NOTE — TELEPHONE ENCOUNTER
Pt states he will try and have EMG completed at the [place] and has copy of order.   Electronically signed by Thaddeus Stinson on 6/18/20 at 3:32 PM EDT

## 2020-06-18 NOTE — PROGRESS NOTES
[facility]. Deon Arellano M.D., F.A.C.P. Maggie Reeves, DNP, APRN, ACNS-BC  Janece Ovens. Gloria Easley, MSN, APRN-FNP-C  Ever Moore MSPAS, PA-C  Raffi Light, MSN, APRN-FNP-C  [address], [facility]  [place], [address] [address]  Phone: [phone]  Fax: [phone]       Birdie Hanson is a 39 y.o. right handed male       Past Medical History:     Past Medical History:   Diagnosis Date    Alpha-1-antitrypsin deficiency ([facility] [address]     Alpha-1-antitrypsin deficiency carrier     Anxiety     Depression     Fatty liver     Intermittent explosive disorder     Peripheral neuropathic pain     PTSD (post-traumatic stress disorder)        Past Surgical History:       Past Surgical History:   Procedure Laterality Date    COSMETIC SURGERY      LIVER BIOPSY      SKIN BIOPSY         Allergies:       Klonopin [clonazepam] and Lorazepam    Medications:     Prior to Admission medications    Medication Sig Start Date End Date Taking? Authorizing Provider   pantoprazole (PROTONIX) 40 MG tablet Take 40 mg by mouth daily   Yes Historical Provider, MD   buPROPion (WELLBUTRIN XL) 300 MG extended release tablet Take 300 mg by mouth every morning   Yes Historical Provider, MD   benztropine (COGENTIN) 1 MG tablet Take 1 mg by mouth nightly    Yes Historical Provider, MD   rOPINIRole (REQUIP) 0.25 MG tablet Take 0.25 mg by mouth nightly    Yes Historical Provider, MD   sildenafil (VIAGRA) 100 MG tablet Take 100 mg by mouth as needed for Erectile Dysfunction   Yes Historical Provider, MD   gabapentin (NEURONTIN) 400 MG capsule Take 400 mg by mouth 2 times daily.    Yes Historical Provider, MD   risperiDONE (RISPERDAL) 1 MG tablet Take 1 mg by mouth daily    Yes Historical Provider, MD   traZODone (DESYREL) 50 MG tablet Take 50 mg by mouth nightly    Yes Historical Provider, MD   Levothyroxine Sodium (LEVOTHROID PO) Take 100 mcg by mouth daily    Yes Historical Provider, MD   SUMAtriptan (IMITREX) 50

## 2020-06-18 NOTE — PATIENT INSTRUCTIONS
Patient Education        galcanezumab  Pronunciation:  GAL ka PATRICA ue mab  Brand:  Emgality Prefilled Pen, Emgality Prefilled Syringe  What is the most important information I should know about galcanezumab? Follow all directions on your medicine label and package. Tell each of your healthcare providers about all your medical conditions, allergies, and all medicines you use. What is galcanezumab? Carlena Doctor is used to prevent migraine headaches in adults. Carlena Doctor is also used to treat cluster headache episodes in adults. Carlena Doctor may also be used for purposes not listed in this medication guide. What should I discuss with my healthcare provider before using galcanezumab? You should not use galcanezumab if you are allergic to it. Carlena Doctor is not approved for use by anyone younger than 25years old. Tell your doctor if you are pregnant. It is not known whether this medicine will harm an unborn baby. However, having migraine headaches during pregnancy may cause preeclampsia (dangerously high blood pressure that can lead to medical problems in both mother and baby). The benefit of preventing migraines may outweigh any risks to the baby. It may not be safe to breast-feed while using this medicine. Ask your doctor about any risk. How should I use galcanezumab? Follow all directions on your prescription label and read all medication guides or instruction sheets. Use the medicine exactly as directed. Carlena Doctor is injected under the skin. A healthcare provider may teach you how to properly use the medication by yourself. For prevention of migraine headaches:  Carlena Doctor is usually given as a first dose of 2 injections, followed by 1 injection once per month. For treatment of a cluster headache episodes:  Carlena Doctor is usually given as 3 injections at the start of the cluster period, followed by 1 injection once per month until the end of the cluster period.   Follow your doctor's dosing at [phone]. What other drugs will affect galcanezumab? Other drugs may affect galcanezumab, including prescription and over-the-counter medicines, vitamins, and herbal products. Tell your doctor about all your current medicines and any medicine you start or stop using. Where can I get more information? Your pharmacist can provide more information about galcanezumab. Remember, keep this and all other medicines out of the reach of children, never share your medicines with others, and use this medication only for the indication prescribed. Every effort has been made to ensure that the information provided by [address] [place], [place] [address]can [address] is accurate, up-to-date, and complete, but no guarantee is made to that effect. Drug information contained herein may be time sensitive. [facility] information has been compiled for use by healthcare practitioners and consumers in the [place] and therefore [facility] does not warrant that uses outside of the [place] are appropriate, unless specifically indicated otherwise. [facility]'s drug information does not endorse drugs, diagnose patients or recommend therapy. [facility]'s drug information is an informational resource designed to assist licensed healthcare practitioners in caring for their patients and/or to serve consumers viewing this service as a supplement to, and not a substitute for, the expertise, skill, knowledge and judgment of healthcare practitioners. The absence of a warning for a given drug or drug combination in no way should be construed to indicate that the drug or drug combination is safe, effective or appropriate for any given patient. [facility] does not assume any responsibility for any aspect of healthcare administered with the aid of information [facility] provides. The information contained herein is not intended to cover all possible uses, directions, precautions, warnings, drug interactions, allergic reactions, or adverse effects.  If you have questions

## 2020-07-22 LAB
AVERAGE GLUCOSE: 88
HBA1C MFR BLD: 5.2 %
TSH SERPL DL<=0.05 MIU/L-ACNC: 3.2 UIU/ML

## 2020-07-28 ENCOUNTER — OFFICE VISIT (OUTPATIENT)
Dept: NEUROLOGY | Age: 45
End: 2020-07-28
Payer: MEDICARE

## 2020-07-28 VITALS
OXYGEN SATURATION: 95 % | DIASTOLIC BLOOD PRESSURE: 70 MMHG | HEART RATE: 80 BPM | SYSTOLIC BLOOD PRESSURE: 107 MMHG | TEMPERATURE: 98.4 F

## 2020-07-28 PROCEDURE — 1036F TOBACCO NON-USER: CPT | Performed by: PHYSICIAN ASSISTANT

## 2020-07-28 PROCEDURE — 99214 OFFICE O/P EST MOD 30 MIN: CPT | Performed by: PHYSICIAN ASSISTANT

## 2020-07-28 PROCEDURE — G8427 DOCREV CUR MEDS BY ELIG CLIN: HCPCS | Performed by: PHYSICIAN ASSISTANT

## 2020-07-28 PROCEDURE — G8419 CALC BMI OUT NRM PARAM NOF/U: HCPCS | Performed by: PHYSICIAN ASSISTANT

## 2020-07-28 NOTE — PROGRESS NOTES
Imitrex was increased to 100 mg as needed at his last visit as well. The higher dose does seem to abort his headaches for him. He also reports having \"neuropathy\"   Reports numbness and tingling in the toes and fingertips   Also reports weakness in the arms bilaterally   He is not diabetic. He does have a history of moderate regular alcohol consumption. He does  not have a family history of neuropathy   At his last visit I ordered labs in addition to an EMG. These were not completed. He is on gabapentin 400 mg twice daily for his neuropathic pains. He has tried higher doses in  the past but states that they made him spacey. He does admit to history of chronic neck pain, but denies any shooting pains down his arms. He reports that his symptoms are worse at night and lately have been affecting his arms more  than his feet. He also reports memory loss. He tells me that he cannot remember when his memory problems started but does note that they have been getting worse over the last couple years. His primary complaint is that he often loses his train of thought during conversation and frequently has to ask for questions to be repeated. He denies any difficulty driving or getting lost while driving. he has no difficulties with his ADLs. There have been no dangerous behaviors. He does forget dates, appointments and needs to write these things down. He does report that agitation has always been present but denies any significant changes in his personality over the last couple years. He denies impulsive gambling or shopping. He does admit to an increased sex drive over the last couple years. He does have a significant psychiatric history due to PTSD from the war in addition to anxiety and depression. He is on Wellbutrin, Risperdal, trazodone. He does follow with someone for this and feels that it is moderately controlled. He continues to sleeps poorly.   Only getting 4 to 5 hours of sleep per night.  He does not feel rested during the day. Medically, he is otherwise stable. No chest pain or palpitations  No SOB  No vertigo, lightheadedness or loss of consciousness  No falls, tripping or stumbling  No incontinence of bowels or bladder  No itching or bruising appreciated  No numbness, tingling or focal arm/leg weakness  + Headache  + Photophobia  + Nausea and vomiting  + Numbness tingling in toes and fingers  + Weakness of the arms bilaterally  + Memory loss    ROS is otherwise negative    Current Outpatient Medications on File Prior to Visit   Medication Sig Dispense Refill    pantoprazole (PROTONIX) 40 MG tablet Take 40 mg by mouth daily      SUMAtriptan (IMITREX) 100 MG tablet Take 1 tablet by mouth as needed for Migraine (can repeat dose in 2 hours if first dose ineffective, not to exceed 2 doses in 24 hours) 9 tablet 3    buPROPion (WELLBUTRIN XL) 300 MG extended release tablet Take 300 mg by mouth every morning      benztropine (COGENTIN) 1 MG tablet Take 1 mg by mouth nightly       rOPINIRole (REQUIP) 0.25 MG tablet Take 0.25 mg by mouth nightly       sildenafil (VIAGRA) 100 MG tablet Take 100 mg by mouth as needed for Erectile Dysfunction      gabapentin (NEURONTIN) 400 MG capsule Take 400 mg by mouth 2 times daily.  risperiDONE (RISPERDAL) 1 MG tablet Take 1 mg by mouth daily       traZODone (DESYREL) 50 MG tablet Take 50 mg by mouth nightly       Levothyroxine Sodium (LEVOTHROID PO) Take 100 mcg by mouth daily       Galcanezumab-gnlm (EMGALITY) 120 MG/ML SOAJ Inject 120 mg into the skin every 30 days (Patient not taking: Reported on 7/28/2020) 1 pen 11     No current facility-administered medications on file prior to visit.         Objective:       /70 (Site: Right Upper Arm)   Pulse 80   Temp 98.4 °F (36.9 °C)   SpO2 95%     General appearance: alert, appears stated age, cooperative and in no distress  Head: normocephalic, without obvious abnormality, atraumatic--no occipital groove tenderness  Eyes: conjunctivae/corneas clear; no drainage  Neck: no adenopathy, no carotid bruit, supple, symmetrical, trachea midline and thyroid not enlarged, symmetric, no tenderness/mass/nodules--good ROM, no cervical paraspinal tenderness or spasms  Lungs: clear to auscultation bilaterally  Heart: regular rate and rhythm, S1, S2 normal, no murmur  Extremities: normal, atraumatic, no cyanosis or edema  Pulses: 2+ and symmetric  Skin:  color, texture, turgor normal--no rashes or lesions      Mental Status: alert and oriented. Pleasant. Easily loses train of thought and requires repeated instruction or questions to be asked in order to answer. MMSE 30/30.     Fair attention/concentration  Memories questionable    Speech: no dysarthria  Language: no aphasias    Cranial Nerves:  I: smell    II: visual acuity     II: visual fields Full    II: pupils BETZAIDA   III,VII: ptosis None   III,IV,VI: extraocular muscles  EOMI without nystagmus   V: mastication Normal   V: facial light touch sensation  Normal   V,VII: corneal reflex     VII: facial muscle function - upper  Normal   VII: facial muscle function - lower Normal   VIII: hearing Normal   IX: soft palate elevation  Normal   IX,X: gag reflex    XI: trapezius strength  5/5   XI: sternocleidomastoid strength 5/5   XI: neck extension strength  5/5   XII: tongue strength  Normal     Motor:  5/5 throughout  Normal bulk and tone  No drift   No abnormal movements    Sensory:  LT normal  Vibration minimally decreased at the ankles bilaterally  PP reported as normal in all extremities today    Coordination:   FN, FFM and TONG normal  HS normal    Gait:  Normal    DTR:   +2 throughout    No Danielson's    No other pathological reflexes    Laboratory/Radiology:  ry/Radiology:     CBC with Differential:    Lab Results   Component Value Date    WBC 4.6 03/29/2019    RBC 4.87 03/29/2019    HGB 15.3 03/29/2019    HCT 44.8 03/29/2019     03/29/2019    MCV 92.0 depression in addition to his medications such  as Wellbutrin, Risperdal, trazodone and gabapentin are contributing to his easy distractibility   We will send him for neuropsychological testing in the future      Plan:     Imitrex 100 mg PRN migraines     Emgality 120 mg/ml once monthly injections    Continue gabapentin 4 mg twice daily    Labs    EMG    Consider neuropsychological testing at next visit    Headache diary    Lifestyle modifications    Return to office in 2 months    Call with questions or concerns    Zoraida Strong PA-C  8:52 AM  7/28/2020    I spent 60 minutes with this patient obtaining the HPI and discussing the exam with greater than 50% of the time providing counseling and education on medications and other treatment plans. All questions were answered prior to leaving my office.

## 2020-08-31 ENCOUNTER — TELEPHONE (OUTPATIENT)
Dept: NEUROLOGY | Age: 45
End: 2020-08-31

## 2020-08-31 NOTE — TELEPHONE ENCOUNTER
Makenzie Sullivan left message for patient to have overdue labs that need drawn.   Electronically signed by Hans Alegria MA on 8/31/20 at 1:25 PM EDT

## 2020-10-01 ENCOUNTER — OFFICE VISIT (OUTPATIENT)
Dept: NEUROLOGY | Age: 45
End: 2020-10-01
Payer: MEDICARE

## 2020-10-01 VITALS
DIASTOLIC BLOOD PRESSURE: 70 MMHG | SYSTOLIC BLOOD PRESSURE: 122 MMHG | BODY MASS INDEX: 23.61 KG/M2 | WEIGHT: 184 LBS | TEMPERATURE: 97.2 F | HEART RATE: 80 BPM | OXYGEN SATURATION: 97 % | HEIGHT: 74 IN

## 2020-10-01 PROCEDURE — G8427 DOCREV CUR MEDS BY ELIG CLIN: HCPCS | Performed by: PHYSICIAN ASSISTANT

## 2020-10-01 PROCEDURE — 99213 OFFICE O/P EST LOW 20 MIN: CPT | Performed by: PHYSICIAN ASSISTANT

## 2020-10-01 PROCEDURE — G8420 CALC BMI NORM PARAMETERS: HCPCS | Performed by: PHYSICIAN ASSISTANT

## 2020-10-01 PROCEDURE — G8484 FLU IMMUNIZE NO ADMIN: HCPCS | Performed by: PHYSICIAN ASSISTANT

## 2020-10-01 PROCEDURE — 1036F TOBACCO NON-USER: CPT | Performed by: PHYSICIAN ASSISTANT

## 2020-10-01 RX ORDER — GALCANEZUMAB 120 MG/ML
120 INJECTION, SOLUTION SUBCUTANEOUS
Qty: 1 PEN | Refills: 11 | Status: SHIPPED | OUTPATIENT
Start: 2020-10-01

## 2020-10-01 NOTE — PROGRESS NOTES
[address] [facility]. Ekaterina Benson M.D., F.A.C.P. Savi Metz, DNP, APRN, ACNS-BC  Dwain Cardenasz. Clinton Blanco, MSN, APRN-FNP-C  ETHAN CastellanoAS, PA-C  Yoly Clement, MSN, APRN-FNP-C  [address], [address]  Phone: [phone]  Fax: [phone]       Rockie Mortimer is a 39 y.o. right handed male     He presents for follow-up of headaches, numbness and tingling, and memory loss. Past medical history is significant for anxiety, depression, PTSD, peripheral neuropathy, intermittent explosive disorder, alpha-1 antitrypsin deficiency, fatty liver    This headache started 21 years ago after he was in [address] [place] [address] and suffered head trauma. They have gradually been worsening in frequency and severity over the years. He describes the headaches as retro-orbital, sharp stabbing pain with associated photophobia, nausea and vomiting on occasion. He also reports that prior to the onset of these headaches he will see a shadow as if you are shining a flashlight through a fan in his vision. Within 3 to 5 minutes his headache will begin. He also reports a history of vertigo associated with his headaches. He denies any phonophobia. Laying down in a dark room will help alleviate his headaches. He suffers from these type of headaches that are 8 out of 10 severity 1 to 2 days/week. He has noticed that stress makes it more likely for him to have a headache. They do not wake him up from sleep. They are not associated with position changes. He also suffers from daily headaches that are mild in nature that feels like a dull ache. He also reports having \"neuropathy\"   Reports numbness and tingling in the toes and fingertips   Also reports weakness in the arms bilaterally   He is not diabetic. He does have a history of moderate regular alcohol  consumption. He does not have a family history of neuropathy   At his last visit I ordered labs in addition to an EMG.   These were not completed. He is on gabapentin 400 mg twice daily for his neuropathic pains. He has tried  higher doses in  the past but states that they made him spacey. He does admit to history of chronic neck pain, but denies any shooting pains  down his arms. He reports that his symptoms are worse at night and lately  have been affecting his arms more  than his feet. He does have a significant psychiatric history due to PTSD from the war in addition to anxiety and depression. He is on Wellbutrin, Risperdal, trazodone. He does follow with someone for this and feels that it is moderately controlled. He continues to sleeps poorly. Only getting 4 to 5 hours of sleep per night. He does not feel rested during the day. Interval history   Since his last visit he has started Emgality-- his headache days have decreased to 1-2 per month. He continues to use Imitrex prn which works moderately well. EMG and labs were not completed since last visit     He has no new neurologic complaints. Medically, he is otherwise stable.     No chest pain or palpitations  No SOB  No vertigo, lightheadedness or loss of consciousness  No falls, tripping or stumbling  No incontinence of bowels or bladder  No itching or bruising appreciated  No numbness, tingling or focal arm/leg weakness  + Headache  + Photophobia  + Nausea and vomiting  + Numbness tingling in toes and fingers  + Weakness of the arms bilaterally  + Memory loss    ROS is otherwise negative    Current Outpatient Medications on File Prior to Visit   Medication Sig Dispense Refill    pantoprazole (PROTONIX) 40 MG tablet Take 40 mg by mouth daily      Galcanezumab-gnlm (EMGALITY) 120 MG/ML SOAJ Inject 120 mg into the skin every 30 days 1 pen 11    SUMAtriptan (IMITREX) 100 MG tablet Take 1 tablet by mouth as needed for Migraine (can repeat dose in 2 hours if first dose ineffective, not to exceed 2 doses in 24 hours) 9 tablet 3    buPROPion (WELLBUTRIN XL) 300 MG extended release tablet Take 300 mg by mouth every morning      benztropine (COGENTIN) 1 MG tablet Take 1 mg by mouth nightly       rOPINIRole (REQUIP) 0.25 MG tablet Take 0.25 mg by mouth nightly       sildenafil (VIAGRA) 100 MG tablet Take 100 mg by mouth as needed for Erectile Dysfunction      gabapentin (NEURONTIN) 400 MG capsule Take 400 mg by mouth 2 times daily.  risperiDONE (RISPERDAL) 1 MG tablet Take 1 mg by mouth daily       traZODone (DESYREL) 50 MG tablet Take 50 mg by mouth nightly       Levothyroxine Sodium (LEVOTHROID PO) Take 100 mcg by mouth daily        No current facility-administered medications on file prior to visit. Objective:       /70 (Site: Right Upper Arm)   Pulse 80   Temp 97.2 °F (36.2 °C)   Ht 6' 2\" (1.88 m)   Wt 184 lb (83.5 kg)   SpO2 97%   BMI 23.62 kg/m²     General appearance: alert, appears stated age, cooperative and in no distress  Head: normocephalic, without obvious abnormality, atraumatic--no occipital groove tenderness  Eyes: conjunctivae/corneas clear; no drainage  Neck: no adenopathy, no carotid bruit, supple, symmetrical, trachea midline and thyroid not enlarged, symmetric, no tenderness/mass/nodules--good ROM, no cervical paraspinal tenderness or spasms  Lungs: clear to auscultation bilaterally  Heart: regular rate and rhythm, S1, S2 normal, no murmur  Extremities: normal, atraumatic, no cyanosis or edema  Pulses: 2+ and symmetric  Skin:  color, texture, turgor normal--no rashes or lesions      Mental Status: alert and oriented. Pleasant. Easily loses train of thought.      Fair attention/concentration  Memories questionable    Speech: no dysarthria  Language: no aphasias    Cranial Nerves:  I: smell    II: visual acuity     II: visual fields Full    II: pupils BETZAIDA   III,VII: ptosis None   III,IV,VI: extraocular muscles  EOMI without nystagmus   V: mastication Normal   V: facial light touch sensation  Normal   V,VII: corneal reflex VII: facial muscle function - upper  Normal   VII: facial muscle function - lower Normal   VIII: hearing Normal   IX: soft palate elevation  Normal   IX,X: gag reflex    XI: trapezius strength  5/5   XI: sternocleidomastoid strength 5/5   XI: neck extension strength  5/5   XII: tongue strength  Normal     Motor:  5/5 throughout  Normal bulk and tone  No drift   No abnormal movements    Sensory:  LT normal  Vibration minimally decreased at the ankles bilaterally    Coordination:   FN, FFM  normal  HS normal    Gait:  Normal    DTR:   +2 throughout    No Danielson's    No other pathological reflexes    Laboratory/Radiology:  ry/Radiology:     Labs from [place] reviewed     TSH- WNL   HA1C- 5.3     CBC, CMP WNL     All labs and images were personally reviewed at the time of this visit    Assessment:     Migraines with aura now complicated by chronic daily headaches with 4-8 migraine days per month    Good response to Imitrex 100 mg as needed and will continue    Good improvement to 1-2 HA days per month with Emgality      Numbness of toes and fingertips and subjective weakness of arms b/l-- worse at night and radiate down both arms-- ? Cervical radiculopathy      ?  PN 2/2 hx of moderate alcohol consumption -- reportedly has been told he had  \"neuropathy\"  in the past    Labs and EMG were not completed since last visit    Subjective Memory loss    He scored a 30/30 on MMSE at last visit    He does easily loses train of thought and requires repeated asking of questions  throughout the entire visit   His poor sleep is likely contributing   I question if his underlying PTSD, anxiety and depression in addition to his medications such as Wellbutrin, Risperdal, trazodone and gabapentin are contributing to his easy distractibility   Consider sending for neuropsych testing in the future       Plan:     Imitrex 100 mg PRN migraines    May repeat dose in 2 hours if needed     Emgality 120 mg/ml once monthly injections    Continue gabapentin 400 mg twice daily    Labs    EMG to be completed     Consider neuropsychological testing at next visit    Headache diary    Lifestyle modifications    Return to office in 3 months    Call with questions or concerns    Blanca Shelby PA-C  9:02 AM  10/1/2020    I spent 15 minutes with this patient obtaining the HPI and discussing the exam with greater than 50% of the time providing counseling and education on medications and other treatment plans. All questions were answered prior to leaving my office.

## 2020-10-02 ENCOUNTER — TELEPHONE (OUTPATIENT)
Dept: NEUROLOGY | Age: 45
End: 2020-10-02

## 2020-10-02 NOTE — TELEPHONE ENCOUNTER
Patient needs to take Emgality Rx to [place] to refill at their pharmacy. .  Electronically signed by Yolande Bumpers, MA on 10/2/20 at 3:47 PM EDT

## 2020-10-13 ENCOUNTER — TELEPHONE (OUTPATIENT)
Dept: NEUROLOGY | Age: 45
End: 2020-10-13

## 2020-10-13 NOTE — TELEPHONE ENCOUNTER
MA notified that his appt on 12/22/20 was canceled. Drake Sarkar not in the office. Message left for patient to call back and reschedule.   Electronically signed by Carter Arellano MA on 10/13/20 at 2:26 PM EDT

## 2021-11-09 ENCOUNTER — HOSPITAL ENCOUNTER (EMERGENCY)
Age: 46
Discharge: HOME | End: 2021-11-09
Attending: EMERGENCY MEDICINE
Payer: MEDICARE

## 2021-11-09 ENCOUNTER — APPOINTMENT (OUTPATIENT)
Dept: GENERAL RADIOLOGY | Age: 46
End: 2021-11-09
Payer: MEDICARE

## 2021-11-09 VITALS
DIASTOLIC BLOOD PRESSURE: 70 MMHG | HEIGHT: 74 IN | BODY MASS INDEX: 24.38 KG/M2 | SYSTOLIC BLOOD PRESSURE: 120 MMHG | WEIGHT: 190 LBS | OXYGEN SATURATION: 98 % | HEART RATE: 60 BPM | RESPIRATION RATE: 16 BRPM | TEMPERATURE: 98.2 F

## 2021-11-09 DIAGNOSIS — M54.32 SCIATICA OF LEFT SIDE: Primary | ICD-10-CM

## 2021-11-09 PROCEDURE — 6360000002 HC RX W HCPCS: Performed by: EMERGENCY MEDICINE

## 2021-11-09 PROCEDURE — 72110 X-RAY EXAM L-2 SPINE 4/>VWS: CPT

## 2021-11-09 PROCEDURE — 73502 X-RAY EXAM HIP UNI 2-3 VIEWS: CPT

## 2021-11-09 PROCEDURE — 96372 THER/PROPH/DIAG INJ SC/IM: CPT

## 2021-11-09 PROCEDURE — 99283 EMERGENCY DEPT VISIT LOW MDM: CPT

## 2021-11-09 RX ORDER — CYCLOBENZAPRINE HCL 10 MG
10 TABLET ORAL 3 TIMES DAILY PRN
Qty: 21 TABLET | Refills: 0 | Status: SHIPPED | OUTPATIENT
Start: 2021-11-09 | End: 2021-11-19

## 2021-11-09 RX ORDER — PREDNISONE 20 MG/1
20 TABLET ORAL 2 TIMES DAILY
Qty: 10 TABLET | Refills: 0 | Status: SHIPPED | OUTPATIENT
Start: 2021-11-09 | End: 2021-11-14

## 2021-11-09 RX ORDER — KETOROLAC TROMETHAMINE 30 MG/ML
30 INJECTION, SOLUTION INTRAMUSCULAR; INTRAVENOUS ONCE
Status: COMPLETED | OUTPATIENT
Start: 2021-11-09 | End: 2021-11-09

## 2021-11-09 RX ORDER — DEXAMETHASONE SODIUM PHOSPHATE 10 MG/ML
10 INJECTION INTRAMUSCULAR; INTRAVENOUS ONCE
Status: COMPLETED | OUTPATIENT
Start: 2021-11-09 | End: 2021-11-09

## 2021-11-09 RX ADMIN — KETOROLAC TROMETHAMINE 30 MG: 30 INJECTION, SOLUTION INTRAMUSCULAR at 20:51

## 2021-11-09 RX ADMIN — DEXAMETHASONE SODIUM PHOSPHATE 10 MG: 10 INJECTION INTRAMUSCULAR; INTRAVENOUS at 20:53

## 2021-11-09 ASSESSMENT — PAIN SCALES - GENERAL
PAINLEVEL_OUTOF10: 8
PAINLEVEL_OUTOF10: 10

## 2021-11-09 NOTE — Clinical Note
Miriam Zambrano was seen and treated in our emergency department on 11/9/2021. He may return to work on 11/12/2021. If you have any questions or concerns, please don't hesitate to call.       Shahla Cooper, DO

## 2021-11-10 NOTE — ED PROVIDER NOTES
Oliva Louis is a 55 y.o. male presenting to the ED for left low back pain left hip pain radiates down leg, beginning 6-8 weeks ago. The complaint has been intermittent, moderate in severity, and worsened by nothing. Patient is a 49-year-old male with history of chronic migraine headaches secondary to PTSD and head injury sustained in the [employer] in the late [address] [place] [address]. Patient states he has had persistent headaches ever since then. He states he is also had chronic low back pain he states its been in the left lower back that radiates into his hip and his buttock and down his legs. He denies any numbness tingling or weakness. He denies any urinary or bowel incontinence or retention. He states is been going on for some time and he has not had it evaluated by anyone. He does follow with neurosurgery for another complaint. He denies any acute neurological complaints at this time. Patient states his pain is 10 out of 10 and worse with standing and ambulating. Review of Systems:   Pertinent positives and negatives are stated within HPI, all other systems reviewed and are negative.          --------------------------------------------- PAST HISTORY ---------------------------------------------  Past Medical History:  has a past medical history of Alpha-1-antitrypsin deficiency ([facility] [address] Alpha-1-antitrypsin deficiency carrier, Anxiety, Depression, Fatty liver, Intermittent explosive disorder, Peripheral neuropathic pain, and PTSD (post-traumatic stress disorder). Past Surgical History:  has a past surgical history that includes skin biopsy; liver biopsy; and Cosmetic surgery. Social History:  reports that he has never smoked. He has never used smokeless tobacco. He reports that he does not drink alcohol and does not use drugs. Family History: family history is not on file. The patients home medications have been reviewed.     Allergies: Klonopin [clonazepam] and Lorazepam    -------------------------------------------------- RESULTS -------------------------------------------------  All laboratory and radiology results have been personally reviewed by myself   LABS:  No results found for this visit on 11/09/21. RADIOLOGY:  Interpreted by Radiologist.  XR HIP 2-3 VW W PELVIS LEFT   Final Result   1. Preserved vertebral body height and lateral alignment in the lumbar   spine. Slight levocurvature of the thoracolumbar spine. Mild L5-S1 facet   arthrosis. 2.  No acute osseous findings in the pelvis nor hips on this exam.      RECOMMENDATION:   (Negative radiographs should not deter from obtaining cross-sectional imaging   if there is high concern for an acute osseous injury. )         XR LUMBAR SPINE (MIN 4 VIEWS)   Final Result   1. Preserved vertebral body height and lateral alignment in the lumbar   spine. Slight levocurvature of the thoracolumbar spine. Mild L5-S1 facet   arthrosis. 2.  No acute osseous findings in the pelvis nor hips on this exam.      RECOMMENDATION:   (Negative radiographs should not deter from obtaining cross-sectional imaging   if there is high concern for an acute osseous injury. )             ------------------------- NURSING NOTES AND VITALS REVIEWED ---------------------------   The nursing notes within the ED encounter and vital signs as below have been reviewed. /70   Pulse 60   Temp 97.9 °F (36.6 °C) (Temporal)   Resp 16   Ht 6' 2\" (1.88 m)   Wt 190 lb (86.2 kg)   SpO2 97%   BMI 24.39 kg/m²   Oxygen Saturation Interpretation: Normal      ---------------------------------------------------PHYSICAL EXAM--------------------------------------    Physical Exam  Vitals reviewed. Constitutional:       General: He is not in acute distress. Appearance: Normal appearance. He is not toxic-appearing. HENT:      Head: Normocephalic and atraumatic.       Right Ear: External ear normal.      Left Ear: External ear normal. Nose: Nose normal. No congestion. Mouth/Throat:      Mouth: Mucous membranes are moist.      Pharynx: Oropharynx is clear. No posterior oropharyngeal erythema. Eyes:      Extraocular Movements: Extraocular movements intact. Pupils: Pupils are equal, round, and reactive to light. Cardiovascular:      Rate and Rhythm: Normal rate and regular rhythm. Pulses: Normal pulses. Heart sounds: No murmur heard. Pulmonary:      Effort: Pulmonary effort is normal.      Breath sounds: No wheezing or rhonchi. Chest:      Chest wall: No tenderness. Abdominal:      General: Bowel sounds are normal.      Tenderness: There is no abdominal tenderness. There is no right CVA tenderness, left CVA tenderness or guarding. Musculoskeletal:         General: No swelling or deformity. Cervical back: Normal range of motion and neck supple. No muscular tenderness. Thoracic back: Normal. No tenderness or bony tenderness. Lumbar back: Spasms and tenderness present. No swelling, edema, deformity, signs of trauma or bony tenderness. Negative right straight leg raise test and negative left straight leg raise test.        Back:    Skin:     General: Skin is warm and dry. Capillary Refill: Capillary refill takes less than 2 seconds. Neurological:      General: No focal deficit present. Mental Status: He is alert and oriented to person, place, and time.    Psychiatric:         Mood and Affect: Mood normal.               ------------------------------ ED COURSE/MEDICAL DECISION MAKING----------------------  Medications   dexamethasone (DECADRON) injection 10 mg (10 mg IntraMUSCular Given 11/9/21 2053)   ketorolac (TORADOL) injection 30 mg (30 mg IntraMUSCular Given 11/9/21 2051)         ED COURSE:       Medical Decision Making:    Pt found to have sciatica, no evidence of cord compression no alrm sx, pt to be discharge, recommend rice, nsaids, flexerill, f/w pcp and neurosurg    Risks and benefits were discussed with patient for All medications dispensed and given in department as well prescriptions prescribed for home, . The patient elected to take the medicine. Pt instructed on warning signs and precautions for medication side effects. The patient was given warning signs for when to seek medical attention. Counseled regarding todays diagnosis, including possible risks and complications,  especially if left uncontrolled. Counseled regarding the possible side effects, risks, benefits and alternatives to treatment; patient and/or guardian verbalizes understanding, agrees, feels comfortable with and wishes to proceed with treatment plan. Advised patient to call his primary care physician with any new medication issues, and read all Rx info from pharmacy to assure aware of all possible risks and side effects of medication before taking. I did discuss warning signs for when to return to the Emergency Room, and the patient verbalized understanding      Counseling: The emergency provider has spoken with the patient and discussed todays results, in addition to providing specific details for the plan of care and counseling regarding the diagnosis and prognosis. Questions are answered at this time and they are agreeable with the plan.      --------------------------------- IMPRESSION AND DISPOSITION ---------------------------------    IMPRESSION  1. Sciatica of left side        DISPOSITION  Disposition: Discharge to home  Patient condition is fair      NOTE: This report was transcribed using voice recognition software.  Every effort was made to ensure accuracy; however, inadvertent computerized transcription errors may be present       Kathleen Del Rosario DO  11/09/21 [id number]